# Patient Record
(demographics unavailable — no encounter records)

---

## 2024-12-31 NOTE — PHYSICAL EXAM
[Normal] : normal rate, regular rhythm, normal S1 and S2 and no murmur heard [No Varicosities] : no varicosities [Pedal Pulses Present] : the pedal pulses are present [No Edema] : there was no peripheral edema [No Extremity Clubbing/Cyanosis] : no extremity clubbing/cyanosis [Soft] : abdomen soft [Non Tender] : non-tender [Non-distended] : non-distended [Normal Bowel Sounds] : normal bowel sounds [de-identified] : Midline chronic scar noted in the abdomen.  2 small scars noted on the right sideand 1 scar on the left side of abdomen. (States these scars have been there since age 25 due to colostomy/reversal colostomy) [de-identified] : Low back pain is tender to palpation (tenderness noted in the mid low back) (lower lumbar area)

## 2024-12-31 NOTE — HISTORY OF PRESENT ILLNESS
[FreeTextEntry1] : check up [de-identified] : 43-year-old female is here for checkup.  This is my first time seeing this patient.   Patient denies any fevers, chills, nausea, vomiting, diarrhea, constipation, chest pain, shortness of breath, weakness, numbness, tingling at this time.   Patient reports that she has had 0 COVID vaccines in the past.   Alcohol: Denies. Patient reports that they drink alcohol occasionally (once every 3 months). Smoking: Denies. Patient reports that they have never smoked cigarettes. Illicit Drugs: Denies. Patient reports that they do not use any recreational drugs. PAP smear:  Patient reports that their last PAP smear was in 2022.  They report that the PAP smear was normal.  They report that a repeat PAP smear is due in 1 year in 2023.  States she would like to see a new OB/GYN at this point.  Will refer to OB/GYN Diet: Patient reports that they are good with diet Exercise: Patient reports that they are not good with exercise Living Situation: Family.  Patient reports that they live with son. Employment Status: Employed. Patient reports that they are employed at this point as a . Education: Reports that the highest level of education is High school. Relationship Status: Single.  Number of kids : 1  ADLs: Fully functional (bathing, dressing, toileting, transferring, walking, feeding).  Patient reports that they can perform ADLs IADLs: Fully functional and needs no help or supervision to perform IADLs (using the telephone, shopping, preparing meals, housekeeping, doing laundry, using transportation, managing medications and managing finances).  Patient reports that they can perform IADLs.

## 2024-12-31 NOTE — ASSESSMENT
[FreeTextEntry1] : We will do routine blood work in the form of CBC, CMP, A1c, lipid, TSH, B12 folate at this point.  she may follow-up earlier if needed Pt was told to call with problems or concerns. The patient was told to seek immediate attention by calling 911 or going to the ER if her condition does not improve or gets acutely worse.   Told to follow-up in 3 months for routine follow-up at this point

## 2025-01-02 NOTE — REASON FOR VISIT
[Home] : at home, [unfilled] , at the time of the visit. [Medical Office: ([facility])___] : at the medical office located in  [Patient] : the patient [Initial Consultation] : an initial consultation for [FreeTextEntry2] : anemia

## 2025-01-02 NOTE — HISTORY OF PRESENT ILLNESS
[de-identified] : 43 year old female from [facility] with Pmhx Of lower back pains,  Uterine Fibroids with ongoing menses presents for an evaluation of anemia.  She was hospitalized  on 24 due to ongoing menses and passing of large clots.  She reports to have almost 30 days of menorrhagia changing pads every hour. When she was admitted to [facility] on 24  she received 2 units of blood due to hgb of 5.7 which improved hgb to 7.0 but then dropped to 6 sharon.  She then needed an additional 1 unit.  Since dischrage she was placed on Norethidrone q 8 hours and has improved somewhat her menses.  On 24 she saw Dr Mercedes Bardales which showed labs Ferritin 5,  Hgb 7.6 g/dl, HCT 28.9% MCV70.1 fl, MCH 18.4 pg, MCHC 26.3, Red cell Dist width 31.9%,   K/uL. Vitamin B12 431 pg/ml, FA 13.8/ ng/ml, TSH normal. Today she does not have her menses.  Denies excessive or spontaneous bleeding or bruising. Denies  enlarged nodes. Denies  dysuria,  nocturia,  hesitancy,  urgency, oliguria,  hematuria, incontinence. Denies  nausea,  vomiting,  diarrhea,  Constipation,  heartburn, abdominal pain,  jaundice, melena, blood in stool. Denies  palpitations,  dyspnea, orthopnea, PND, Denies claudication,  edema,  varicose veins, Denies Fever, rigors,  diaphoresis.  Denies anorexia,  weight loss, sleep disturbance.  Denies resting dyspnea, extertional dyspnea, wheezing, cough,  stridor,  hemoptysis, Denies rash,  pruritis,  skin lesions, Denies bone pain,  joint swelling,  limited range of motion, Patient does not complain of frequent or severe infections. Patient does not complain of any allergic reactions.  She was also noted with a 1.9 cm mass deep to the right gluteal muscle on a CT scan done on 24 ,  CT was done due to ongoing lower back pains.    Pmhx: Uterine fibroids,  Menorrhagia,  Psx: C sec  cholostomy sec to c-sec . No Cholostomy present. NKDA Past OB/gyn : 3 total pregnancies,  1 live birth,  1 pregnancy - elective , 1 spontaneous .   Social Hx: Denies TOB, social ETOH,  Denies Marijuana or other illicit drugs,  works in customer service.  single with 1 child Fam hx : Mother HTN, MI - CAD stents/CABG,  Father  at 40 from MI- PCIs, Brother MI- PCI Born in [facility].  Other specialists : Gastroenterologist,  Orthopedics, GYN.  Last mammogram/sono : none

## 2025-01-02 NOTE — ASSESSMENT
[FreeTextEntry1] : 3 year old female from [facility] with Pmhx Of lower back pains,  Uterine Fibroids with ongoing menses presents for an evaluation of anemia.  She was hospitalized  on 12/19/24 due to ongoing menses and passing of large clots.  She reports to have almost 30 days of menorrhagia changing pads every hour. When she was admitted to [facility] on 12.19.24  she received 2 units of blood due to hgb of 5.7 which improved hgb to 7.0 but then dropped to 6 sharon.  She then needed an additional 1 unit.  Since dischrage she was placed on Norethidrone q 8 hours and has improved somewhat her menses.  On 12.31.24 she saw Dr Mercedes Bardales which showed labs Ferritin 5,  Hgb 7.6 g/dl, HCT 28.9% MCV70.1 fl, MCH 18.4 pg, MCHC 26.3, Red cell Dist width 31.9%,   K/uL. Vitamin B12 431 pg/ml, FA 13.8/ ng/ml, TSH normal.   Problem # 1 Anemia of iron deficiency  Given Hx, age, parity, labs, all consistent with Iron deficiency anemia. Feeling tired weak, but no cardiorespiratory distress, PO iron may not be sufficient to counterbalance the heavy loss of iron that she experiences through the heavy menstrual and  intermenstrual bleeding. We'll begin IV iron next asap . She was also asked to see her gastroenterologist, so that further W/U, to R/O another possible source of bleeding.  -due to recent lab draws on 12.31.24 does not need repeat Iron studies  - I have called schedulers for  IV iron for jan 6th 2025 at 4 pm  - she is consented via telehealth,   - Will start with Weekly or daily IV iron ( Venofer) or ferraheme,  will check iron studies after 6th cycle. -Risks, benefits, side effects and administration of tx discussed. -Patient verbalized understanding and opted for IV iron -Pt consented via telehealth today.  - advised that if any CP or SOB ,dizziness occurs to go to local ER.   - due to having chronic blood loss -  will also r.o VW disease -  ordered VW panel.  - will also r.o thalassema alpha and beta -  labs ordered.  - LDH /haptoglobin to r.o Hemolytic anemia. -emailed to go to any local [facility] for lab draws Prior to monday.   #1.9 cm deep to the right gluteal muscle on a CT scan done on 12.24.24 ,   -CT was done due to ongoing lower back pains-  going to see Ortho - needs further workup prior oncology visit.  - recommending MRI of thigh to further evaluate mass.  - if suspicious will need biopsy of mass.

## 2025-01-06 NOTE — ASSESSMENT
[FreeTextEntry1] : Patient with excessive menstrual bleeding and iron deficiency anemia.  FOBT will be sent to the lab.  Patient will be scheduled for a screening colonoscopy as well as an upper endoscopy.  She has been using NSAIDs because of back pain.  She was told to stop NSAIDs and to take Tylenol instead.

## 2025-01-06 NOTE — HISTORY OF PRESENT ILLNESS
[FreeTextEntry1] : Patient is a 43-year-old female with fibroids and increased menstrual bleeding.  She has been found to be iron deficient and has been given 3 units of packed cells.  She is due for an iron infusion today.  Her last H/H was 7.6/28.9, MCV 70.  This was from .  Iron studies revealed iron/TIBC 18/456, 4% iron saturation, ferritin 5. Her bowel movements are regular.  She denies seeing any blood or mucus in the stool.  She has no abdominal pain. She is suffering from back pain and has been taking NSAIDs frequently. Patient had a  done 16 years ago.  The colon was nicked and she needed to have a temporary colostomy which was subsequently reversed.

## 2025-01-07 NOTE — DATA REVIEWED
[Imaging Present] : Present [de-identified] : CT abdomen and pelvis 12/24/2024 IMPRESSION: - No bowel obstruction or bowel wall thickening. - Possible 1.9 cm mass deep to the right gluteal musculature and interposed between the right hip and ischium. Recommend further evaluation with MRI of the right hip when clinically feasible.

## 2025-01-07 NOTE — REVIEW OF SYSTEMS
[Nl] : Hematologic/Lymphatic [Joint Pain] : no joint pain [Joint Stiffness] : no joint stiffness [Joint Swelling] : no joint swelling

## 2025-01-07 NOTE — HISTORY OF PRESENT ILLNESS
[Stable] : stable [0] : currently ~his/her~ pain is 0 out of 10 [FreeTextEntry1] : This is a 43 year old woman who has a somewhat complex recent history of having significant vaginal bleeding and gynecologic problems. She was admitted to the hospital with a Hb of 4 and received a few units of blood. While in the hospital, she received two CTs of her abdomen and pelvis; the second one found a mass deep in the musculature between the right hip and ischium. She did not know about the mass prior to then. She states she occasionally has back pain, especially after the transfusions. She is being currently being worked up by GI, hemeonc, as well as gynecology for separate issues. She was sent to me after the mass was found.

## 2025-01-07 NOTE — ADDENDUM
[FreeTextEntry1] : I, Nav Myers, documented this note as a scribe on behalf of Dr. Clark Buck on 01/07/2025.

## 2025-01-07 NOTE — DISCUSSION/SUMMARY
[All Questions Answered] : Patient (and family) had all questions answered to an agreeable level of satisfaction [Interested in Proceeding] : Patient (and family) expressed understanding and interest in proceeding with the plan as outlined [de-identified] : Incidental finding of a mass seen near the sciatic nerve on the right side right behind the hip. I would like to get a little bit more information on this with an MRI scan with and without contrast of the area. She is being worked up for other things anyway, however I think it would be appropriate for her to be worked up for this as well. I will see her again after imaging is done.  If imaging or pathology/biopsy was ordered, the patient was told to make an appointment to review findings right after all imaging is completed.   We discussed risks, benefits and alternatives. Rationale of care was reviewed and all questions were answered.

## 2025-01-07 NOTE — END OF VISIT
[FreeTextEntry3] : All medical record entries made by the Scribe were at my, Dr. Clark Buck, direction and personally dictated by me on 01/07/2025. I have reviewed the chart and agree that the record accurately reflects my personal performance of the history, physical exam, assessment and plan. I have also personally directed, reviewed, and agreed with the chart.

## 2025-01-07 NOTE — PHYSICAL EXAM
[General Appearance - Well-Appearing] : Well appearing [General Appearance - Well Nourished] : well nourished [Oriented To Time, Place, And Person] : Oriented to person, place, and time [Sclera] : the sclera and conjunctiva were normal [Neck Cervical Mass (___cm)] : no neck mass was observed [Heart Rate And Rhythm] : heart rate was normal and rhythm regular [] : No respiratory distress [Abdomen Soft] : Soft [Normal Station and Gait] : gait and station were normal [FreeTextEntry1] : On exam the patient stands in good balance and walks normally. She has no palpable masses in either buttock. She has full ROM of her bilateral hips with no pain. There are no Tinel's, thrills, or bruits in the area. She has no pain with her hip and external rotation loading the ischiofemoral space. There are no paresthesias radiating down her leg. She has good strength and ROM of her knees, ankle, and feet, and there is no lymphadenopathy.

## 2025-01-15 NOTE — REVIEW OF SYSTEMS
[Fatigue] : fatigue [Abdominal Pain] : abdominal pain [Abn Vaginal bleeding] : abnormal vaginal bleeding [Negative] : Heme/Lymph

## 2025-01-21 NOTE — HISTORY OF PRESENT ILLNESS
[Menarche Age: ____] : age at menarche was [unfilled] [Currently Active] : currently active [Patient reported PAP Smear was normal] : Patient reported PAP Smear was normal [Regular Cycle Intervals] : periods have been regular [Frequency: Q ___ days] : menstrual periods occur approximately every [unfilled] days [No] : No [TextBox_19] : cannot remember [PapSmeardate] : 2022 [FreeTextEntry1] : 12/01/24

## 2025-01-21 NOTE — PLAN
[FreeTextEntry1] : Will clarify other pregnancies with her on f/u Urine preg neg ER records/labs/imaging and notes from other providers reviewed BP elevated today, normal on prior visits, ER visit AUB ACOG info provided STI testing today RTO for EMB Recheck CBC today Continue Aygestin until f/u - we reviewed in brief medical, interventional and surgical options She also scored a 10 on PHQ9 which we discussed options and she was given information/referral for behavioral health support resources

## 2025-01-21 NOTE — END OF VISIT
[Time Spent: ___ minutes] : I have spent [unfilled] minutes of time on the encounter which excludes teaching and separately reported services. 13:45

## 2025-01-21 NOTE — PHYSICAL EXAM
[Chaperone Present] : A chaperone was present in the examining room during all aspects of the physical examination [60747] : A chaperone was present during the pelvic exam. [Appropriately responsive] : appropriately responsive [Alert] : alert [Oriented x3] : oriented x3 [Labia Majora] : normal [Labia Minora] : normal [No Bleeding] : There was no active vaginal bleeding [Normal] : normal [Tenderness] : tender [Mass ___ cm] : a [unfilled] ~Ucm uterine mass was palpated [Uterine Adnexae] : non-palpable [FreeTextEntry2] :  Soledad Leon MA [FreeTextEntry7] : vertical midline scar, C section transverse pfannenstiel scar, left upper abdomen 3 cm scar, colostomy RLQ scar  [Enlarged ___ wks] : not enlarged

## 2025-01-21 NOTE — PHYSICAL EXAM
[Chaperone Present] : A chaperone was present in the examining room during all aspects of the physical examination [70905] : A chaperone was present during the pelvic exam. [Appropriately responsive] : appropriately responsive [Alert] : alert [Oriented x3] : oriented x3 [Labia Majora] : normal [Labia Minora] : normal [No Bleeding] : There was no active vaginal bleeding [Normal] : normal [Tenderness] : tender [Mass ___ cm] : a [unfilled] ~Ucm uterine mass was palpated [Uterine Adnexae] : non-palpable [FreeTextEntry2] :  Soledad Leon MA [FreeTextEntry7] : vertical midline scar, C section transverse pfannenstiel scar, left upper abdomen 3 cm scar, colostomy RLQ scar  [Enlarged ___ wks] : not enlarged

## 2025-01-21 NOTE — PHYSICAL EXAM
[Chaperone Present] : A chaperone was present in the examining room during all aspects of the physical examination [57564] : A chaperone was present during the pelvic exam. [Appropriately responsive] : appropriately responsive [Alert] : alert [Oriented x3] : oriented x3 [Labia Majora] : normal [Labia Minora] : normal [No Bleeding] : There was no active vaginal bleeding [Normal] : normal [Tenderness] : tender [Mass ___ cm] : a [unfilled] ~Ucm uterine mass was palpated [Uterine Adnexae] : non-palpable [FreeTextEntry2] :  Soledad Leon MA [FreeTextEntry7] : vertical midline scar, C section transverse pfannenstiel scar, left upper abdomen 3 cm scar, colostomy RLQ scar  [Enlarged ___ wks] : not enlarged

## 2025-02-26 NOTE — DATA REVIEWED
[Imaging Present] : Present [de-identified] : MRI right hip with and without contrast 1/23/2025 IMPRESSION: 1.  In the region of the gemellus inferior muscle, there is a 1.8 x 1.5 x 2.3 cm ovoid hypointense enhancing mass with central hypoenhancement/necrosis. Lesion abuts but does not definitively connect to the sciatic nerve. Lesion is nonspecific in appearance. Fibrous etiology is a differential consideration given signal hypointensity. This be an atypical location for a giant cell. 2.  Diffuse T1 hypointensity of the bone marrow, which is likely secondary to marrow reconversion given the documented history of severe anemia.

## 2025-02-26 NOTE — PHYSICAL EXAM
[General Appearance - Well-Appearing] : Well appearing [General Appearance - Well Nourished] : well nourished [Oriented To Time, Place, And Person] : Oriented to person, place, and time [Sclera] : the sclera and conjunctiva were normal [Neck Cervical Mass (___cm)] : no neck mass was observed [Heart Rate And Rhythm] : heart rate was normal and rhythm regular [] : No respiratory distress [Abdomen Soft] : Soft [Normal Station and Gait] : gait and station were normal [FreeTextEntry1] : On exam the patient stands in good balance and walks normally. On exam she still has no palpable mass in the right hip or buttock but still has some tenderness deep in that area. There are no Tinel's, thrills, or bruits in the area. She has full ROM of her bilateral hips with no pain. She has no pain with external rotation loading the ischiofemoral space. There are no paresthesias radiating down her leg. She has good strength and ROM of her knees, ankle, and feet, and there is no lymphadenopathy.

## 2025-02-26 NOTE — HISTORY OF PRESENT ILLNESS
[Stable] : stable [0] : currently ~his/her~ pain is 0 out of 10 [FreeTextEntry1] : Patient is back to review a recent MRI scan. She still has the same pain in her lower back but does have some buttock pain separately. She does not have any pain radiating down her hip.

## 2025-02-26 NOTE — ADDENDUM
[FreeTextEntry1] : I, Nav Myers, documented this note as a scribe on behalf of Dr. Clark Buck on 02/26/2025.

## 2025-02-26 NOTE — DATA REVIEWED
[Imaging Present] : Present [de-identified] : MRI right hip with and without contrast 1/23/2025 IMPRESSION: 1.  In the region of the gemellus inferior muscle, there is a 1.8 x 1.5 x 2.3 cm ovoid hypointense enhancing mass with central hypoenhancement/necrosis. Lesion abuts but does not definitively connect to the sciatic nerve. Lesion is nonspecific in appearance. Fibrous etiology is a differential consideration given signal hypointensity. This be an atypical location for a giant cell. 2.  Diffuse T1 hypointensity of the bone marrow, which is likely secondary to marrow reconversion given the documented history of severe anemia.

## 2025-02-26 NOTE — END OF VISIT
[FreeTextEntry3] : All medical record entries made by the Scribe were at my, Dr. Clark Buck, direction and personally dictated by me on 02/26/2025. I have reviewed the chart and agree that the record accurately reflects my personal performance of the history, physical exam, assessment and plan. I have also personally directed, reviewed, and agreed with the chart.

## 2025-02-26 NOTE — DISCUSSION/SUMMARY
[All Questions Answered] : Patient (and family) had all questions answered to an agreeable level of satisfaction [Interested in Proceeding] : Patient (and family) expressed understanding and interest in proceeding with the plan as outlined [de-identified] : We discussed that this is an indeterminate mass that could be very fibrous in nature. I do not think this is anything particularly malignant; however, there is no way to tell for certain without a biopsy. We discussed that with biopsy, there is a risk including nerve drop as well as the possibility of malignancy. She is undecided on whether to pursue surgery or not. Should she not wish to get a biopsy at this time, I would have her get a repeat X-ray in two months.  If imaging or pathology/biopsy was ordered, the patient was told to make an appointment to review findings right after all imaging is completed.   We discussed risks, benefits and alternatives. Rationale of care was reviewed and all questions were answered.

## 2025-02-26 NOTE — DISCUSSION/SUMMARY
[All Questions Answered] : Patient (and family) had all questions answered to an agreeable level of satisfaction [Interested in Proceeding] : Patient (and family) expressed understanding and interest in proceeding with the plan as outlined [de-identified] : We discussed that this is an indeterminate mass that could be very fibrous in nature. I do not think this is anything particularly malignant; however, there is no way to tell for certain without a biopsy. We discussed that with biopsy, there is a risk including nerve drop as well as the possibility of malignancy. She is undecided on whether to pursue surgery or not. Should she not wish to get a biopsy at this time, I would have her get a repeat X-ray in two months.  If imaging or pathology/biopsy was ordered, the patient was told to make an appointment to review findings right after all imaging is completed.   We discussed risks, benefits and alternatives. Rationale of care was reviewed and all questions were answered.

## 2025-03-21 NOTE — PHYSICAL EXAM
[de-identified] :  Lumbar Physical Exam   Gait - Normal   Station - Normal   Sagittal balance - Normal   Compensatory mechanism? - None   Heel walk - Normal   Toe walk - Normal   Reflexes Patellar - normal Gastroc - normal Clonus - No   Hip Exam - Normal   Straight leg raise - none   Pulses - 2+ dp/pt   Range of motion - normal   Sensation Sensation intact to light touch in L1, L2, L3, L4, L5 and S1 dermatomes bilaterally   Motor IP Quad HS TA Gastroc EHL Right 5/5 5/5 5/5 5/5 5/5 5/5 Left 5/5 5/5 5/5 5/5 5/5 5/5 [de-identified] : lumbar rads mild to moderate disc degeneration  mild facet arthropathy possible AIS curve

## 2025-03-21 NOTE — HISTORY OF PRESENT ILLNESS
[de-identified] : Patient is a 43 year old female who presents today for an initial evaluation of low back pain that began a few months ago. Denies any trauma or inciting incident. Denies radicular sxs. Her pain is exacerbated by moving and ascending/descending stairs. She has not been taking any pain medications.

## 2025-03-21 NOTE — ASSESSMENT
[FreeTextEntry1] :  I had a lengthy discussion with the patient in regards to treatment plan and diagnosis. There are no red flag findings on imaging nor are there any red flag findings on clinical exams.  Therefore we will proceed with a course of conservative treatment. This would include physical therapy/home exercise program, Meloxicam, Tizanidine, and Tylenol as medically indicated.  The patient will follow up with me in approximately 4 to 5 weeks.  I encouraged the patient to follow-up sooner if there are any new or worsening symptoms.

## 2025-03-21 NOTE — ADDENDUM
[FreeTextEntry1] :  I, Soniya Banks, acted solely as a scribe for Dr. Reilly White MD on this date 03/21/2025    All medical record entries made by the Scribe were at my, Dr. Reilly White MD., direction and personally dictated by me on 03/21/2025 . I have reviewed the chart and agree that the record accurately reflects my personal performance of the history, physical exam, assessment and plan. I have also personally directed, reviewed, and agreed with the chart.